# Patient Record
Sex: MALE | Race: WHITE | Employment: FULL TIME | URBAN - METROPOLITAN AREA
[De-identification: names, ages, dates, MRNs, and addresses within clinical notes are randomized per-mention and may not be internally consistent; named-entity substitution may affect disease eponyms.]

---

## 2022-01-06 ENCOUNTER — OFFICE VISIT (OUTPATIENT)
Dept: ORTHOPEDIC SURGERY | Age: 52
End: 2022-01-06
Payer: COMMERCIAL

## 2022-01-06 VITALS — BODY MASS INDEX: 31.5 KG/M2 | HEIGHT: 70 IN | WEIGHT: 220 LBS

## 2022-01-06 DIAGNOSIS — M16.11 ARTHRITIS OF RIGHT HIP: Primary | ICD-10-CM

## 2022-01-06 PROCEDURE — 99203 OFFICE O/P NEW LOW 30 MIN: CPT | Performed by: ORTHOPAEDIC SURGERY

## 2022-01-06 RX ORDER — DICLOFENAC SODIUM 75 MG/1
75 TABLET, DELAYED RELEASE ORAL 2 TIMES DAILY WITH MEALS
Qty: 60 TABLET | Refills: 0 | Status: SHIPPED | OUTPATIENT
Start: 2022-01-06 | End: 2022-02-07 | Stop reason: SDUPTHER

## 2022-01-06 NOTE — PROGRESS NOTES
Ellis Almaraz (: 1970) is a 45 y.o. male patient, here for evaluation of the following chief complaint(s):  Hip Pain (right hip pain)       ASSESSMENT/PLAN:  Below is the assessment and plan developed based on review of pertinent history, physical exam, labs, studies, and medications. 65-year-old male with end-stage right hip osteoarthritis. He has bone-on-bone and has no remaining joint space. He said the hip has been bothersome for more than a year. He is also having some lower back pain. I gave him a prescription for physical therapy for the hip and the back. I also called in diclofenac. He is considering possible surgery for his hip in the future. He will let us know if he decides to move forward      1. Arthritis of right hip      Encounter Diagnosis   Name Primary?  Arthritis of right hip Yes        No follow-ups on file. SUBJECTIVE/OBJECTIVE:  Ellis Almaraz (: 1970) is a 45 y.o. male who presents today for the following:  Chief Complaint   Patient presents with    Hip Pain     right hip pain       65-year-old male comes in today complaining of right hip pain. The pain is in his groin and thigh. Is been going on for more than a year. It has progressed and is now moderate and daily. He can walk 6 blocks or less. He does not use a cane but does notice a slight limp. He works as a  and interferes at work as well. He feels like his legs are the same length. He has had oral medication in the past and has been taking ibuprofen 3 times a day recently    IMAGING:  XR Results (most recent):  Results from Appointment encounter on 22    XR HIP RT W OR WO PELV 2-3 VWS    Narrative  AP and lateral x-rays were ordered and reviewed right hip. End-stage right hip osteoarthritis with impingement and osteophyte formation. No remaining joint space. Subchondral sclerosis present.        No Known Allergies    Current Outpatient Medications   Medication Sig    IBUPROFEN PO Take  by mouth.  diclofenac EC (VOLTAREN) 75 mg EC tablet Take 1 Tablet by mouth two (2) times daily (with meals).  OTHER Vit D, Vit C, Garlic, Fish Oil are all OTC meds pt takes but unsure what dosages (Patient not taking: Reported on 1/6/2022)     No current facility-administered medications for this visit. Past Medical History:   Diagnosis Date    Hernia     Right bundle branch block         No past surgical history on file. No family history on file. Social History     Tobacco Use    Smoking status: Never Smoker    Smokeless tobacco: Not on file   Substance Use Topics    Alcohol use: No        All systems reviewed x 12 and were negative with the exception of none      No flowsheet data found. Vitals:  Ht 5' 10\" (1.778 m)   Wt 220 lb (99.8 kg)   BMI 31.57 kg/m²    Body mass index is 31.57 kg/m². Physical Exam    General: NAD, well developed, well nourished. Cardiac: Extremities well perfused. Respiratory: Nonlabored breathing. RLE: Slight antalgic gait. Discomfort with stinchfield. 0-100 degrees of flexion. 10 degrees internal rotation, >30 degrees external rotation. Negative CHANDRIKA. Mild pain with flexion adduction and internal rotation. .  Motor strength grossly intact. LLE: No antalgic gait. Negative stinchfield. 0-100 degrees of flexion. >20 degrees internal rotation, >30 degrees external rotation. Negative CHANDRIKA. No pain with flexion adduction and internal rotation. .  Motor strength grossly intact. Skin: Warm well perfused. Vascular: Palpable pedal pulses bilaterally. Equal. Capillary refill less than 2 seconds. An electronic signature was used to authenticate this note.   -- Paulina Carmona MD

## 2022-01-10 DIAGNOSIS — M16.11 ARTHRITIS OF RIGHT HIP: Primary | ICD-10-CM

## 2022-01-31 ENCOUNTER — DOCUMENTATION ONLY (OUTPATIENT)
Dept: ORTHOPEDIC SURGERY | Age: 52
End: 2022-01-31

## 2022-02-07 DIAGNOSIS — M16.11 ARTHRITIS OF RIGHT HIP: ICD-10-CM

## 2022-02-07 RX ORDER — DICLOFENAC SODIUM 75 MG/1
75 TABLET, DELAYED RELEASE ORAL 2 TIMES DAILY WITH MEALS
Qty: 60 TABLET | Refills: 0 | Status: SHIPPED | OUTPATIENT
Start: 2022-02-07 | End: 2022-07-29

## 2022-02-14 ENCOUNTER — OFFICE VISIT (OUTPATIENT)
Dept: ORTHOPEDIC SURGERY | Age: 52
End: 2022-02-14
Payer: COMMERCIAL

## 2022-02-14 DIAGNOSIS — M16.11 ARTHRITIS OF RIGHT HIP: Primary | ICD-10-CM

## 2022-02-14 DIAGNOSIS — Z96.641 STATUS POST RIGHT HIP REPLACEMENT: Primary | ICD-10-CM

## 2022-02-14 PROCEDURE — 97161 PT EVAL LOW COMPLEX 20 MIN: CPT | Performed by: PHYSICAL THERAPIST

## 2022-02-14 NOTE — PROGRESS NOTES
Patient Initial Evaluation    Patient Name: Chris Brasher  Date:2022  : 1970  [x]  Patient  Verified  Payor: Kim De Santiago / Plan: [employer] PPO / Product Type: PPO /    Total Treatment Time (min): 20    Treatment Area: R hip    HPI    The patient is a 27-year-old male referred to physical therapy by Dr. Sobia Nugent pre-right total hip arthroplasty anterior approach to be completed in one week. The patient states that he will be accompanied by his wife following the surgery. He has 3 steps to enter his home and plans to stay on the first level of his home the night following surgery. The patient plans to use a FWW for ambulation. He plans to have home health physical therapy within 24 hours of surgery    OBJECTIVE    Transfers: Patient was instructed in use of a rolling walker. Range of motion: Deferred at this time    Strength: Deferred at this time    Gait: Patient was instructed in and provided with printout describing proper gait technique with use of FWW. Treatment:  Provided patient with written/pictorial home exercise program for range of motion, quad activation, and DVT prevention. Patient was also provided with printout for hip precautions and proper gait/ stair technique. Total treatment time 20 min. ASSESSMENT      Patient was educated on impairments related to gait, swelling, decreased range of motion, quad activation, lower extremity strength, balance, impaired ability to ambulate, negotiate stairs, perform ADLs and participate in desired activities following right total hip replacement. He was instructed in exercises and gait training to address these limitations following surgery. Goals  1. Patient will demonstrate compliance with home exercise program.      ICD-10-CM ICD-9-CM    1. Arthritis of right hip  M16.11 716.95      PLAN  Patient plans to receive home health PT within 24 hours following surgery.

## 2022-02-23 DIAGNOSIS — Z98.890 STATUS POST HIP SURGERY: Primary | ICD-10-CM

## 2022-02-23 RX ORDER — OXYCODONE HYDROCHLORIDE 5 MG/1
5 TABLET ORAL
Qty: 42 TABLET | Refills: 0 | Status: SHIPPED | OUTPATIENT
Start: 2022-02-23 | End: 2022-03-05

## 2022-02-23 RX ORDER — TRAMADOL HYDROCHLORIDE 50 MG/1
50 TABLET ORAL
Qty: 42 TABLET | Refills: 0 | Status: SHIPPED | OUTPATIENT
Start: 2022-02-23 | End: 2022-03-10

## 2022-02-23 RX ORDER — GUAIFENESIN 100 MG/5ML
81 LIQUID (ML) ORAL 2 TIMES DAILY
Qty: 60 TABLET | Refills: 0 | Status: SHIPPED | OUTPATIENT
Start: 2022-02-23 | End: 2022-07-29

## 2022-02-23 RX ORDER — MELOXICAM 7.5 MG/1
7.5 TABLET ORAL DAILY
Qty: 15 TABLET | Refills: 0 | Status: SHIPPED | OUTPATIENT
Start: 2022-02-23 | End: 2022-07-29

## 2022-02-23 RX ORDER — NALOXONE HYDROCHLORIDE 4 MG/.1ML
SPRAY NASAL
Qty: 1 EACH | Refills: 0 | Status: SHIPPED | OUTPATIENT
Start: 2022-02-23 | End: 2022-07-29

## 2022-02-23 RX ORDER — FAMOTIDINE 20 MG/1
20 TABLET, FILM COATED ORAL 2 TIMES DAILY
Qty: 60 TABLET | Refills: 0 | Status: SHIPPED | OUTPATIENT
Start: 2022-02-23 | End: 2022-07-29

## 2022-03-02 ENCOUNTER — DOCUMENTATION ONLY (OUTPATIENT)
Dept: ORTHOPEDIC SURGERY | Age: 52
End: 2022-03-02

## 2022-03-04 ENCOUNTER — DOCUMENTATION ONLY (OUTPATIENT)
Dept: ORTHOPEDIC SURGERY | Age: 52
End: 2022-03-04

## 2022-03-22 ENCOUNTER — OFFICE VISIT (OUTPATIENT)
Dept: ORTHOPEDIC SURGERY | Age: 52
End: 2022-03-22
Payer: COMMERCIAL

## 2022-03-22 VITALS — BODY MASS INDEX: 31.5 KG/M2 | HEIGHT: 70 IN | WEIGHT: 220 LBS

## 2022-03-22 DIAGNOSIS — Z96.641 STATUS POST RIGHT HIP REPLACEMENT: Primary | ICD-10-CM

## 2022-03-22 PROCEDURE — 99024 POSTOP FOLLOW-UP VISIT: CPT | Performed by: ORTHOPAEDIC SURGERY

## 2022-03-22 NOTE — LETTER
NOTIFICATION RETURN TO WORK / SCHOOL    3/22/2022 1:31 PM    Mr. Jeri Denise  [address] [address] [place] [id number]      To Whom It May Concern:    Jeri Denise is currently under the care of [facility]. He will return to work/school on: 4/1/22    If there are questions or concerns please have the patient contact our office.         Sincerely,      Gris Reyes MD

## 2022-03-22 NOTE — Clinical Note
3/22/2022 1:33 PM    Mr. [facility] [id number]              Sincerely,      Rosa Maria Fitch MD

## 2022-03-22 NOTE — PROGRESS NOTES
Moon Walton (: 1970) is a 45 y.o. male patient, here for evaluation of the following chief complaint(s):  Hip Pain (right hip follow up)       ASSESSMENT/PLAN:  Below is the assessment and plan developed based on review of pertinent history, physical exam, labs, studies, and medications. Radiographs reviewed including 2 views of the right hip. Status post hip arthroplasty. No evidence of aseptic loosening. Leg lengths and offset are appropriate. Status post right hip arthroplasty. The patient is doing well and they are happy with progress. I would like to see them back in 6 weeks. 1. Status post right hip replacement  -     XR HIP RT W OR WO PELV 2-3 VWS; Future      Encounter Diagnosis   Name Primary?  Status post right hip replacement Yes        No follow-ups on file. SUBJECTIVE/OBJECTIVE:  Moon Walton (: 1970) is a 45 y.o. male who presents today for the following:  Chief Complaint   Patient presents with    Hip Pain     right hip follow up       Status post right total hip. He is doing great. He has minimal pain. He wants to return to work. He feels like his legs are the same length. He only used a walker for 7 days and then did not use a cane at all. He is very happy with his progress    IMAGING:  XR Results (most recent):  Results from Appointment encounter on 22    XR HIP RT W OR WO PELV 2-3 VWS    Narrative  AP and lateral x-rays ordered and independently reviewed. Status post right total hip arthroplasty. No evidence of complication. Leg length and offset appropriate       No Known Allergies    Current Outpatient Medications   Medication Sig    aspirin 81 mg chewable tablet Take 1 Tablet by mouth two (2) times a day. (Patient not taking: Reported on 3/22/2022)    famotidine (PEPCID) 20 mg tablet Take 1 Tablet by mouth two (2) times a day. (Patient not taking: Reported on 3/22/2022)    meloxicam (MOBIC) 7.5 mg tablet Take 1 Tablet by mouth daily. (Patient not taking: Reported on 3/22/2022)    naloxone [facility]) 4 mg/actuation nasal spray Use 1 spray intranasally, then discard. Repeat with new spray every 2 min as needed for opioid overdose symptoms, alternating nostrils. (Patient not taking: Reported on 3/22/2022)    diclofenac EC (VOLTAREN) 75 mg EC tablet Take 1 Tablet by mouth two (2) times daily (with meals). (Patient not taking: Reported on 3/22/2022)    IBUPROFEN PO Take  by mouth. (Patient not taking: Reported on 3/22/2022)    OTHER Vit D, Vit C, Garlic, Fish Oil are all OTC meds pt takes but unsure what dosages (Patient not taking: Reported on 1/6/2022)     No current facility-administered medications for this visit. Past Medical History:   Diagnosis Date    Hernia     Right bundle branch block         No past surgical history on file. No family history on file. Social History     Tobacco Use    Smoking status: Never Smoker    Smokeless tobacco: Not on file   Substance Use Topics    Alcohol use: No        All systems reviewed x 12 and were negative with the exception of None      No flowsheet data found. Vitals:  Ht 5' 10\" (1.778 m)   Wt 220 lb (99.8 kg)   BMI 31.57 kg/m²    Body mass index is 31.57 kg/m². Physical Exam    General: NAD    Cardiac: Extremities well perfused. Respiratory: Nonlabored breathing. RLE: Incision clean dry and intact with no erythema. Minimal numbness over the LFCN. Normal gait and station. Negative stinchfield. No pain with gentle internal and external rotation. .  Motor strength is grossly intact. Skin warm well perfused. Capillary refill <2 sec. An electronic signature was used to authenticate this note.   -- Bridget Cheney MD

## 2022-03-22 NOTE — LETTER
NOTIFICATION RETURN TO WORK / SCHOOL    3/22/2022 1:33 PM    Mr. Piotr Winchester  [id number] [place] [phone]      To Whom It May Concern:    Piotr Winchester is currently under the care of [facility]. He will return to work/school on: 4/1/22  Also is cleared to drive. If there are questions or concerns please have the patient contact our office.         Sincerely,      Terry Taylor MD

## 2022-07-29 ENCOUNTER — OFFICE VISIT (OUTPATIENT)
Dept: ORTHOPEDIC SURGERY | Age: 52
End: 2022-07-29
Payer: COMMERCIAL

## 2022-07-29 VITALS — HEIGHT: 70 IN | WEIGHT: 220 LBS | BODY MASS INDEX: 31.5 KG/M2

## 2022-07-29 DIAGNOSIS — G89.29 CHRONIC PAIN OF LEFT KNEE: Primary | ICD-10-CM

## 2022-07-29 DIAGNOSIS — M25.562 CHRONIC PAIN OF LEFT KNEE: Primary | ICD-10-CM

## 2022-07-29 DIAGNOSIS — M17.12 ARTHRITIS OF LEFT KNEE: ICD-10-CM

## 2022-07-29 PROCEDURE — 99214 OFFICE O/P EST MOD 30 MIN: CPT | Performed by: ORTHOPAEDIC SURGERY

## 2022-07-29 RX ORDER — DICLOFENAC SODIUM 50 MG/1
50 TABLET, DELAYED RELEASE ORAL 2 TIMES DAILY WITH MEALS
Qty: 60 TABLET | Refills: 0 | Status: SHIPPED | OUTPATIENT
Start: 2022-07-29 | End: 2022-11-01

## 2022-07-29 NOTE — PROGRESS NOTES
Aubrey Alejandre (: 1970) is a 47 y.o. male patient, here for evaluation of the following chief complaint(s):  Knee Pain (Left knee pain/)       ASSESSMENT/PLAN:  Below is the assessment and plan developed based on review of pertinent history, physical exam, labs, studies, and medications. 79-year-old male comes in today complaining of left-sided knee pain. The pain is global.  He has had this for a while but it has been intermittent in nature. He says currently he is taking Tylenol and Aleve and said this regimen has helped fairly significantly. He said the pain is currently only mild but a month ago was moderate to severe. He has some intermittent swelling and tightness. His x-rays reveal medial joint space narrowing. I discussed this with him. We discussed options. Currently he wants to continue a conservative route. I called in diclofenac which she says has helped significantly in the past.  Risk benefits discussed. If the pain returns we also discussed the potential injection in the future. 1. Chronic pain of left knee  -     XR KNEE LT MIN 4 V; Future  2. Arthritis of left knee      Encounter Diagnoses   Name Primary? Chronic pain of left knee Yes    Arthritis of left knee         No follow-ups on file. SUBJECTIVE/OBJECTIVE:  Aubrey Alejandre (: 1970) is a 47 y.o. male who presents today for the following:  Chief Complaint   Patient presents with    Knee Pain     Left knee pain         79-year-old male comes in today complaining of left-sided knee pain. The pain is global.  He has had this for a while but it has been intermittent in nature. He says currently he is taking Tylenol and Aleve and said this regimen has helped fairly significantly. He said the pain is currently only mild but a month ago was moderate to severe. He has some intermittent swelling and tightness. He says his right hip is doing fantastic.     IMAGING:  XR Results (most recent):  Results from Appointment encounter on 07/29/22    XR KNEE LT MIN 4 V    Narrative  4 views left knee ordered and independently reviewed. Left knee reveals medial joint space narrowing with a very small osteophyte formation present. Right knee well-preserved       No Known Allergies    Current Outpatient Medications   Medication Sig    diclofenac EC (VOLTAREN) 50 mg EC tablet Take 1 Tablet by mouth two (2) times daily (with meals). No current facility-administered medications for this visit. Past Medical History:   Diagnosis Date    Hernia     Right bundle branch block         No past surgical history on file. No family history on file. Social History     Tobacco Use    Smoking status: Never    Smokeless tobacco: Not on file   Substance Use Topics    Alcohol use: No        All systems reviewed x 12 and were negative with the exception of None      No flowsheet data found. Vitals:  Ht 5' 10\" (1.778 m)   Wt 220 lb (99.8 kg)   BMI 31.57 kg/m²    Body mass index is 31.57 kg/m². Physical Exam    General: NAD, well developed, well nourished, alert and oriented x 3. Cardiac: Extremities well perfused    Respiratory: Nonlabored breathing    LLE: Normal gait and station. Negative stinchfield. No effusion noted. No previous incisions noted. ROM 0-120 degrees. Grossly stable to varus/valgus stress and anterior/posterior drawer tests. Mild medial joint tenderness. .  Motor grossly intact. RLE: Normal gait and station. Negative stinchfield. No effusion noted. No previous incisions noted. ROM 0-120 degrees. Grossly stable to varus/valgus stress and anterior/posterior drawer tests. Negative McMurrays. Motor grossly intact. Vascular: Palpable pedal pulses, equal bilaterally. Skin: Warm well perfused, cap refill < 2 sec. An electronic signature was used to authenticate this note.   -- Vishnu Amezquita MD

## 2022-10-03 ENCOUNTER — OFFICE VISIT (OUTPATIENT)
Dept: ORTHOPEDIC SURGERY | Age: 52
End: 2022-10-03
Payer: COMMERCIAL

## 2022-10-03 VITALS — BODY MASS INDEX: 31.5 KG/M2 | HEIGHT: 70 IN | WEIGHT: 220 LBS

## 2022-10-03 DIAGNOSIS — M17.12 ARTHRITIS OF LEFT KNEE: Primary | ICD-10-CM

## 2022-10-03 PROCEDURE — 20610 DRAIN/INJ JOINT/BURSA W/O US: CPT | Performed by: PHYSICIAN ASSISTANT

## 2022-10-03 RX ORDER — TRIAMCINOLONE ACETONIDE 40 MG/ML
40 INJECTION, SUSPENSION INTRA-ARTICULAR; INTRAMUSCULAR ONCE
Status: COMPLETED | OUTPATIENT
Start: 2022-10-03 | End: 2022-10-03

## 2022-10-03 RX ADMIN — TRIAMCINOLONE ACETONIDE 40 MG: 40 INJECTION, SUSPENSION INTRA-ARTICULAR; INTRAMUSCULAR at 12:52

## 2022-10-03 NOTE — PROGRESS NOTES
Priyank Doshi (: 1970) is a 47 y.o. male patient, here for evaluation of the following chief complaint(s):  Knee Pain (Left knee pain/)       ASSESSMENT/PLAN:  Below is the assessment and plan developed based on review of pertinent history, physical exam, labs, studies, and medications. 25-year-old male comes in today for follow-up. He was seen a couple months ago for left knee pain. Pain is mainly medially based. He has been taking diclofenac as needed, symptoms continue to occur. X-rays independently reviewed show medial joint space narrowing, remainder of joint appears well-preserved. He would like to try a steroid injection. After verbal consent was obtained I injected  3mL Lidocaine 40 mg triamcinolone into the left knee joint using sterile technique. Patient tolerated well. Continue with activity modification, ice, rest as needed. Plans to monitor symptoms and follow-up as needed. 1. Arthritis of left knee  -     DRAIN/INJECT LARGE JOINT/BURSA  -     triamcinolone acetonide (KENALOG-40) 40 mg/mL injection 40 mg; 40 mg, Intra artICUlar, ONCE, 1 dose, On Mon 10/3/22 at 1300      Encounter Diagnosis   Name Primary? Arthritis of left knee Yes        No follow-ups on file. SUBJECTIVE/OBJECTIVE:  Priyank Doshi (: 1970) is a 47 y.o. male who presents today for the following:  Chief Complaint   Patient presents with    Knee Pain     Left knee pain         25-year-old male comes in today for evaluation of left knee pain. Affects him daily. Mainly medially based. He has been taking over-the-counter medications and prescription medication with intermittent relief in symptoms. Symptoms continue to be bothersome. If he sits for too long he feels that his knee catches locks and feels like it can give way. IMAGING:  XR Results (most recent):  Results from Appointment encounter on 22    XR KNEE LT MIN 4 V    Narrative  4 views left knee ordered and independently reviewed. Left knee reveals medial joint space narrowing with a very small osteophyte formation present. Right knee well-preserved       No Known Allergies    Current Outpatient Medications   Medication Sig    diclofenac EC (VOLTAREN) 50 mg EC tablet Take 1 Tablet by mouth two (2) times daily (with meals). Current Facility-Administered Medications   Medication    triamcinolone acetonide (KENALOG-40) 40 mg/mL injection 40 mg       Past Medical History:   Diagnosis Date    Hernia     Right bundle branch block         No past surgical history on file. No family history on file. Social History     Tobacco Use    Smoking status: Never    Smokeless tobacco: Not on file   Substance Use Topics    Alcohol use: No        All systems reviewed x 12 and were negative with the exception of None      No flowsheet data found. Vitals:  Ht 5' 10\" (1.778 m)   Wt 220 lb (99.8 kg)   BMI 31.57 kg/m²    Body mass index is 31.57 kg/m². Physical Exam    General: NAD, well developed, well nourished, alert and oriented x 3. Cardiac: Extremities well perfused    Respiratory: Nonlabored breathing    LLE: Mild antalgic gait. Mild effusion noted. No previous incisions noted. ROM 0-120 degrees. Grossly stable to varus/valgus stress and anterior/posterior drawer tests. Medial joint line tenderness. Motor grossly intact. RLE: Normal gait and station. Negative stinchfield. No effusion noted. No previous incisions noted. ROM 0-120 degrees. Grossly stable to varus/valgus stress and anterior/posterior drawer tests. Negative McMurrays. Motor grossly intact. Vascular: Palpable pedal pulses, equal bilaterally. Skin: Warm well perfused, cap refill < 2 sec. Scout Cortes M.D. was available for immediate consultation as the supervising physician. An electronic signature was used to authenticate this note.   -- Mayelin Ramos PA-C

## 2022-11-01 ENCOUNTER — OFFICE VISIT (OUTPATIENT)
Dept: ORTHOPEDIC SURGERY | Age: 52
End: 2022-11-01
Payer: COMMERCIAL

## 2022-11-01 VITALS — BODY MASS INDEX: 31.5 KG/M2 | HEIGHT: 70 IN | WEIGHT: 220 LBS

## 2022-11-01 DIAGNOSIS — M17.12 ARTHRITIS OF LEFT KNEE: Primary | ICD-10-CM

## 2022-11-01 PROCEDURE — 99214 OFFICE O/P EST MOD 30 MIN: CPT | Performed by: ORTHOPAEDIC SURGERY

## 2022-11-01 RX ORDER — DICLOFENAC SODIUM 50 MG/1
50 TABLET, DELAYED RELEASE ORAL 2 TIMES DAILY WITH MEALS
Qty: 60 TABLET | Refills: 0 | Status: SHIPPED | OUTPATIENT
Start: 2022-11-01

## 2022-11-01 NOTE — PROGRESS NOTES
Piotr Winchester (: 1970) is a 47 y.o. male patient, here for evaluation of the following chief complaint(s):  Knee Pain (Left knee pain/)       ASSESSMENT/PLAN:  Below is the assessment and plan developed based on review of pertinent history, physical exam, labs, studies, and medications. 13-year-old male comes in today for follow-up. He is being seen for left knee pain. He has known medial joint space narrowing/arthritic changes noted to the left knee. He received a steroid injection about a month ago and had been doing fantastic until a few days ago when he was kneeling down for work and when he got up he had left knee pain. Knee overall feels stable on today's exam.  He states it has improved since few days ago but still intermittently bothersome. Discussed with patient trying diclofenac to help with pain and swelling. Risks and benefits of medication discussed with patient. Patient verbalized understanding. Discussed with patient we can try repeat injection in 2 months if needed or discuss further possible partial knee replacement. Plans to follow-up as needed. 1. Arthritis of left knee  -     diclofenac EC (VOLTAREN) 50 mg EC tablet; Take 1 Tablet by mouth two (2) times daily (with meals). , Normal, Disp-60 Tablet, R-0      Encounter Diagnosis   Name Primary? Arthritis of left knee Yes        No follow-ups on file. SUBJECTIVE/OBJECTIVE:  Piotr Winchester (: 1970) is a 47 y.o. male who presents today for the following:  Chief Complaint   Patient presents with    Knee Pain     Left knee pain         13-year-old male comes in today for follow-up. He is being seen for left knee pain. He has known medial joint space narrowing/arthritic changes noted to the left knee. He received a steroid injection about a month ago and had been doing fantastic until a few days ago when he was kneeling down for work and when he got up he had left knee pain.      IMAGING:  XR Results (most recent):  Results from Appointment encounter on 07/29/22    XR KNEE LT MIN 4 V    Narrative  4 views left knee ordered and independently reviewed. Left knee reveals medial joint space narrowing with a very small osteophyte formation present. Right knee well-preserved       No Known Allergies    Current Outpatient Medications   Medication Sig    diclofenac EC (VOLTAREN) 50 mg EC tablet Take 1 Tablet by mouth two (2) times daily (with meals). No current facility-administered medications for this visit. Past Medical History:   Diagnosis Date    Hernia     Right bundle branch block         No past surgical history on file. No family history on file. Social History     Tobacco Use    Smoking status: Never    Smokeless tobacco: Not on file   Substance Use Topics    Alcohol use: No        All systems reviewed x 12 and were negative with the exception of None      No flowsheet data found. Vitals:  Ht 5' 10\" (1.778 m)   Wt 220 lb (99.8 kg)   BMI 31.57 kg/m²    Body mass index is 31.57 kg/m². Physical Exam    General: NAD, well developed, well nourished, alert and oriented x 3. Cardiac: Extremities well perfused    Respiratory: Nonlabored breathing    LLE: Normal gait and station. Negative stinchfield. No effusion noted. No previous incisions noted. ROM 0-120 degrees. Grossly stable to varus/valgus stress and anterior/posterior drawer tests. Medial lateral joint line tenderness. Motor grossly intact. RLE: Normal gait and station. Negative stinchfield. No effusion noted. No previous incisions noted. ROM 0-120 degrees. Grossly stable to varus/valgus stress and anterior/posterior drawer tests. Negative McMurrays. Motor grossly intact. Vascular: Palpable pedal pulses, equal bilaterally. Skin: Warm well perfused, cap refill < 2 sec. Jose E Bradford M.D. was available for immediate consultation as the supervising physician.         An electronic signature was used to authenticate this note.  -- Sonali Zimmer PA-C

## 2023-01-27 DIAGNOSIS — Z96.641 STATUS POST RIGHT HIP REPLACEMENT: Primary | ICD-10-CM

## 2023-01-27 RX ORDER — AMOXICILLIN 500 MG/1
CAPSULE ORAL
Qty: 4 CAPSULE | Refills: 2 | Status: SHIPPED | OUTPATIENT
Start: 2023-01-27

## 2023-02-07 ENCOUNTER — OFFICE VISIT (OUTPATIENT)
Dept: PRIMARY CARE CLINIC | Age: 53
End: 2023-02-07

## 2023-02-07 VITALS
TEMPERATURE: 97.8 F | BODY MASS INDEX: 35.19 KG/M2 | DIASTOLIC BLOOD PRESSURE: 79 MMHG | OXYGEN SATURATION: 96 % | WEIGHT: 245.8 LBS | HEIGHT: 70 IN | SYSTOLIC BLOOD PRESSURE: 120 MMHG | HEART RATE: 65 BPM | RESPIRATION RATE: 16 BRPM

## 2023-02-07 DIAGNOSIS — S69.92XA INJURY OF FINGER OF LEFT HAND, INITIAL ENCOUNTER: Primary | ICD-10-CM

## 2023-02-07 PROCEDURE — 99203 OFFICE O/P NEW LOW 30 MIN: CPT

## 2023-02-07 RX ORDER — DICLOFENAC SODIUM 75 MG/1
75 TABLET, DELAYED RELEASE ORAL
Qty: 30 TABLET | Refills: 0 | Status: SHIPPED | OUTPATIENT
Start: 2023-02-07 | End: 2023-02-22

## 2023-02-07 NOTE — PROGRESS NOTES
HISTORY OF PRESENT ILLNESS  Claudia Mcarthur is a 47 y.o. male. Chief Complaint   Patient presents with    Finger Swelling     Bent finger. Left pinky finger. Some pain at tip   Patient reports he went to [place] after [place] dog and jammed his left pink finger in the dogs collar x 1.5 weeks ago. Sudden pain to joint at the time of injury but since pain is gone. Denies taking anything for symptoms. The only reason he is here today is because he unable to completely straighten distal joint of finger. Hx of arthritis in right hip s/p hip replacement in 2022. Review of Systems   Musculoskeletal:  Positive for joint pain (distal joint of the left 5th phalange). Skin: Negative. Physical Exam  Musculoskeletal:        Hands:       Comments: Flexion of the distal joint in the left phalange noted, patient unable to straighten distal joint. Increased warmth with erythema / mild swelling /no bruising to joint noted. No obvious deformities noted. Patient has full ROM of finger with mild pinpoint pain when pressure is applied to joint. Past Medical History:   Diagnosis Date    Hernia     Right bundle branch block      History reviewed. No pertinent surgical history. /79 (BP 1 Location: Left arm, BP Patient Position: Sitting, BP Cuff Size: Adult)   Pulse 65   Temp 97.8 °F (36.6 °C) (Temporal)   Resp 16   Ht 5' 10\" (1.778 m)   Wt 245 lb 12.8 oz (111.5 kg)   SpO2 96%   BMI 35.27 kg/m²     ASSESSMENT and PLAN  1. Injury of finger of left hand, initial encounter  -     diclofenac EC (VOLTAREN) 75 mg EC tablet; Take 1 Tablet by mouth two (2) times daily as needed for Pain for up to 15 days. , Normal, Disp-30 Tablet, R-0  -     XR 5TH FINGER LT MIN 2 V; Future -imaging unavailable at the time of visit, patient will return for xray - ortho consult/splint.   -     REFERRAL TO ORTHOPEDICS - follow up        - Finger splinted, educated patient on how to care/manage splint. DDx: Mallet finger with possible fx.   Ulysses Proper Joana Linder, NP

## 2023-02-07 NOTE — PATIENT INSTRUCTIONS
- taking a break from repetitive activities for four to six weeks  - wearing a brace or splint to restrict motion and rest the hand  - applying heat or ice to reduce swelling  - placing your hand in warm water several times throughout the day to relax the tendons and muscles  - gently stretching your fingers to enhance their range of motion

## 2023-02-07 NOTE — PROGRESS NOTES
Identified pt with two pt identifiers(name and ). Reviewed record in preparation for visit and have obtained necessary documentation. Chief Complaint   Patient presents with    Finger Swelling     Bent finger. Left pinky finger. Some pain at tip        Vitals:    23 1255   BP: 120/79   Pulse: 65   Resp: 16   Temp: 97.8 °F (36.6 °C)   TempSrc: Temporal   SpO2: 96%   Weight: 245 lb 12.8 oz (111.5 kg)   Height: 5' 10\" (1.778 m)       Health Maintenance Due   Topic    Hepatitis C Screening     Depression Screen     COVID-19 Vaccine (1)    DTaP/Tdap/Td series (1 - Tdap)    Lipid Screen     Colorectal Cancer Screening Combo     Shingles Vaccine (1 of 2)    Flu Vaccine (1)       Coordination of Care Questionnaire:  :   1) Have you been to an emergency room, urgent care, or hospitalized since your last visit? If yes, where when, and reason for visit? no       2. Have seen or consulted any other health care provider since your last visit? If yes, where when, and reason for visit? NO      Patient is accompanied by self I have received verbal consent from Shin Aviles to discuss any/all medical information while they are present in the room. An electronic signature was used to authenticate this note.   -- Jose Amezquita LPN Unna Boot Text: An Unna boot was placed to help immobilize the limb and facilitate more rapid healing.

## 2023-02-20 ENCOUNTER — OFFICE VISIT (OUTPATIENT)
Dept: ORTHOPEDIC SURGERY | Age: 53
End: 2023-02-20
Payer: COMMERCIAL

## 2023-02-20 VITALS — HEIGHT: 70 IN | BODY MASS INDEX: 35.07 KG/M2 | WEIGHT: 245 LBS

## 2023-02-20 DIAGNOSIS — Z96.641 STATUS POST RIGHT HIP REPLACEMENT: Primary | ICD-10-CM

## 2023-02-20 PROCEDURE — 99213 OFFICE O/P EST LOW 20 MIN: CPT | Performed by: ORTHOPAEDIC SURGERY

## 2023-02-20 NOTE — PROGRESS NOTES
Oneal Lynch (: 1970) is a 47 y.o. male patient, here for evaluation of the following chief complaint(s):  Surgical Follow-up (Right hip follow up/)       ASSESSMENT/PLAN:  Below is the assessment and plan developed based on review of pertinent history, physical exam, labs, studies, and medications. Radiographs reviewed including 2 views of the right hip. Status post hip arthroplasty. No evidence of aseptic loosening. Leg lengths and offset are appropriate. Status post right hip arthroplasty. The patient is doing well and they are happy with progress. I would like to see them back in 2 years      1. Status post right hip replacement  -     XR HIP RT W OR WO PELV 2-3 VWS; Future      Encounter Diagnosis   Name Primary? Status post right hip replacement Yes        No follow-ups on file. SUBJECTIVE/OBJECTIVE:  Oneal Lynch (: 1970) is a 47 y.o. male who presents today for the following:  Chief Complaint   Patient presents with    Surgical Follow-up     Right hip follow up         Status post right total hip.  1 year postop. Doing fantastic. No pain with stairs or walking. Has no pain rising from sitting or bending. Has no pain lying in bed or sitting. He is very happy with his progress. He does everything that he wants to do    IMAGING:  XR Results (most recent):  Results from Appointment encounter on 23    XR HIP RT W OR WO PELV 2-3 VWS    Narrative  Radiographs reviewed including 2 views of the right hip. Status post hip arthroplasty. No evidence of aseptic loosening. Leg lengths and offset are appropriate. No Known Allergies    No current outpatient medications on file. No current facility-administered medications for this visit. Past Medical History:   Diagnosis Date    Hernia     Right bundle branch block         No past surgical history on file. No family history on file.      Social History     Tobacco Use    Smoking status: Never    Smokeless tobacco: Not on file   Substance Use Topics    Alcohol use: No        All systems reviewed x 12 and were negative with the exception of None      No flowsheet data found. Vitals:  Ht 5' 10\" (1.778 m)   Wt 245 lb (111.1 kg)   BMI 35.15 kg/m²    Body mass index is 35.15 kg/m². Physical Exam    General: NAD    Cardiac: Extremities well perfused. Respiratory: Nonlabored breathing. RLE: Incision clean dry and intact with no erythema. Minimal numbness over the LFCN. Normal gait and station. Negative stinchfield. No pain with gentle internal and external rotation. .  Motor strength is grossly intact. Skin warm well perfused. Capillary refill <2 sec. An electronic signature was used to authenticate this note.   -- Serena Raygoza MD